# Patient Record
Sex: FEMALE | Race: WHITE | NOT HISPANIC OR LATINO | Employment: FULL TIME | URBAN - METROPOLITAN AREA
[De-identification: names, ages, dates, MRNs, and addresses within clinical notes are randomized per-mention and may not be internally consistent; named-entity substitution may affect disease eponyms.]

---

## 2018-01-19 ENCOUNTER — COMMUNICATION - RIVER FALLS (OUTPATIENT)
Dept: FAMILY MEDICINE | Facility: CLINIC | Age: 23
End: 2018-01-19

## 2018-01-19 ENCOUNTER — OFFICE VISIT - RIVER FALLS (OUTPATIENT)
Dept: FAMILY MEDICINE | Facility: CLINIC | Age: 23
End: 2018-01-19

## 2018-01-19 ASSESSMENT — MIFFLIN-ST. JEOR: SCORE: 1324.21

## 2019-01-03 ENCOUNTER — OFFICE VISIT - RIVER FALLS (OUTPATIENT)
Dept: FAMILY MEDICINE | Facility: CLINIC | Age: 24
End: 2019-01-03

## 2019-01-25 ENCOUNTER — OFFICE VISIT - RIVER FALLS (OUTPATIENT)
Dept: FAMILY MEDICINE | Facility: CLINIC | Age: 24
End: 2019-01-25

## 2019-01-25 ASSESSMENT — MIFFLIN-ST. JEOR: SCORE: 1296.99

## 2020-01-27 ENCOUNTER — OFFICE VISIT - RIVER FALLS (OUTPATIENT)
Dept: FAMILY MEDICINE | Facility: CLINIC | Age: 25
End: 2020-01-27

## 2020-01-27 ASSESSMENT — MIFFLIN-ST. JEOR: SCORE: 1281.57

## 2021-01-28 ENCOUNTER — OFFICE VISIT - RIVER FALLS (OUTPATIENT)
Dept: FAMILY MEDICINE | Facility: CLINIC | Age: 26
End: 2021-01-28

## 2021-01-28 ASSESSMENT — MIFFLIN-ST. JEOR: SCORE: 1259.91

## 2021-02-02 ENCOUNTER — COMMUNICATION - RIVER FALLS (OUTPATIENT)
Dept: FAMILY MEDICINE | Facility: CLINIC | Age: 26
End: 2021-02-02

## 2022-01-20 ENCOUNTER — AMBULATORY - RIVER FALLS (OUTPATIENT)
Dept: FAMILY MEDICINE | Facility: CLINIC | Age: 27
End: 2022-01-20

## 2022-01-31 ENCOUNTER — OFFICE VISIT - RIVER FALLS (OUTPATIENT)
Dept: FAMILY MEDICINE | Facility: CLINIC | Age: 27
End: 2022-01-31

## 2022-02-12 VITALS
DIASTOLIC BLOOD PRESSURE: 76 MMHG | SYSTOLIC BLOOD PRESSURE: 118 MMHG | HEART RATE: 72 BPM | TEMPERATURE: 97.4 F | BODY MASS INDEX: 19.93 KG/M2 | WEIGHT: 124 LBS | HEIGHT: 66 IN

## 2022-02-12 VITALS
HEART RATE: 68 BPM | TEMPERATURE: 97.6 F | HEIGHT: 66 IN | DIASTOLIC BLOOD PRESSURE: 70 MMHG | WEIGHT: 114.6 LBS | SYSTOLIC BLOOD PRESSURE: 122 MMHG | BODY MASS INDEX: 18.42 KG/M2

## 2022-02-12 VITALS
HEART RATE: 79 BPM | DIASTOLIC BLOOD PRESSURE: 60 MMHG | WEIGHT: 110.7 LBS | OXYGEN SATURATION: 96 % | SYSTOLIC BLOOD PRESSURE: 96 MMHG | BODY MASS INDEX: 17.79 KG/M2 | HEIGHT: 66 IN | TEMPERATURE: 97.3 F

## 2022-02-12 VITALS
SYSTOLIC BLOOD PRESSURE: 118 MMHG | HEIGHT: 66 IN | HEART RATE: 68 BPM | BODY MASS INDEX: 18.96 KG/M2 | WEIGHT: 118 LBS | DIASTOLIC BLOOD PRESSURE: 74 MMHG | TEMPERATURE: 97.9 F

## 2022-02-16 NOTE — NURSING NOTE
Comprehensive Intake Entered On:  1/27/2020 5:12 PM CST    Performed On:  1/27/2020 5:11 PM CST by Abdiaziz King               Summary   Chief Complaint :   Pt here today for annual px.    Last Menstrual Period :   1/26/2020 CST   Menstrual Status :   Menarcheal   Weight Measured :   114.6 lb(Converted to: 114 lb 10 oz, 51.98 kg)    Height Measured :   66 in(Converted to: 5 ft 6 in, 167.64 cm)    Body Mass Index :   18.49 kg/m2 (LOW)    Body Surface Area :   1.55 m2   Systolic Blood Pressure :   122 mmHg   Diastolic Blood Pressure :   70 mmHg   Mean Arterial Pressure :   87 mmHg   Peripheral Pulse Rate :   68 bpm   BP Site :   Right arm   BP Method :   Manual   HR Method :   Manual   Temperature Tympanic :   97.6 DegF(Converted to: 36.4 DegC)  (LOW)    Race :      Languages :   English   Ethnicity :   Not  or    Abdiaziz King - 1/27/2020 5:11 PM CST   Health Status   Allergies Verified? :   Yes   Medication History Verified? :   Yes   Medical History Verified? :   Yes   Pre-Visit Planning Status :   Completed   Abdiaziz King - 1/27/2020 5:11 PM CST   Consents   Consent for Immunization Exchange :   Consent Granted   Consent for Immunizations to Providers :   Consent Granted   Abdiaziz King - 1/27/2020 5:11 PM CST   Meds / Allergies   (As Of: 1/27/2020 5:12:15 PM CST)   Allergies (Active)   No known allergies  Estimated Onset Date:   Unspecified ; Created By:   Leslie Griffin; Reaction Status:   Active ; Category:   Drug ; Substance:   No known allergies ; Type:   Allergy ; Updated By:   Leslie Griffin; Reviewed Date:   1/27/2020 5:12 PM CST        Medication List   (As Of: 1/27/2020 5:12:15 PM CST)   Prescription/Discharge Order    ethinyl estradiol-norgestimate  :   ethinyl estradiol-norgestimate ; Status:   Prescribed ; Ordered As Mnemonic:   Tri-Linyah 35 mcg oral tablet ; Simple Display Line:   1 tab(s), po, daily, 84 tab(s), 3 Refill(s) ;  Ordering Provider:   Samara Wilcox; Catalog Code:   ethinyl estradiol-norgestimate ; Order Dt/Tm:   1/25/2019 1:23:28 PM CST          tretinoin topical  :   tretinoin topical ; Status:   Prescribed ; Ordered As Mnemonic:   tretinoin 0.1% topical cream ; Simple Display Line:   1 rylie, top, hs, 45 gm, 5 Refill(s) ; Ordering Provider:   Oscar Aburto MD; Catalog Code:   tretinoin topical ; Order Dt/Tm:   1/7/2016 4:59:09 PM CST          erythromycin topical  :   erythromycin topical ; Status:   Prescribed ; Ordered As Mnemonic:   erythromycin 2% topical solution ; Simple Display Line:   1 rylie, TOP, BID, 60 mL, 3 Refill(s) ; Ordering Provider:   Oscar Aburto MD; Catalog Code:   erythromycin topical ; Order Dt/Tm:   1/7/2016 11:02:13 AM CST            Home Meds    multivitamin with minerals  :   multivitamin with minerals ; Status:   Documented ; Ordered As Mnemonic:   Vitamin D with Minerals oral tablet ; Simple Display Line:   1 tab(s), po, daily, 0 Refill(s) ; Catalog Code:   multivitamin with minerals ; Order Dt/Tm:   1/7/2016 10:34:34 AM CST

## 2022-02-16 NOTE — NURSING NOTE
Comprehensive Intake Entered On:  1/28/2021 4:04 PM CST    Performed On:  1/28/2021 4:00 PM CST by Sagrario Avila LPN               Summary   Chief Complaint :   here for annual exam   Last Menstrual Period :   1/24/2021 CST   Menstrual Status :   Menarcheal   Weight Measured :   110.7 lb(Converted to: 110 lb 11 oz, 50.213 kg)    Height Measured :   65.75 in(Converted to: 5 ft 6 in, 167.00 cm)    Body Mass Index :   18 kg/m2 (LOW)    Body Surface Area :   1.52 m2   Systolic Blood Pressure :   96 mmHg   Diastolic Blood Pressure :   60 mmHg   Mean Arterial Pressure :   72 mmHg   Peripheral Pulse Rate :   79 bpm   BP Site :   Right arm   BP Method :   Manual   Temperature Tympanic :   97.3 DegF(Converted to: 36.3 DegC)  (LOW)    Oxygen Saturation :   96 %   Race :      Languages :   English   Ethnicity :   Not  or    Sagrario Avila LPN - 1/28/2021 4:00 PM CST   Health Status   Allergies Verified? :   Yes   Medication History Verified? :   Yes   Medical History Verified? :   No   Pre-Visit Planning Status :   Completed   Tobacco Use? :   Never smoker   Sagrario Avila LPN - 1/28/2021 4:00 PM CST   Meds / Allergies   (As Of: 1/28/2021 4:04:54 PM CST)   Allergies (Active)   No Known Medication Allergies  Estimated Onset Date:   Unspecified ; Created By:   Sagrario Avila LPN; Reaction Status:   Active ; Category:   Drug ; Substance:   No Known Medication Allergies ; Type:   Allergy ; Updated By:   Sagrario Avila LPN; Reviewed Date:   1/28/2021 4:01 PM CST        Medication List   (As Of: 1/28/2021 4:04:54 PM CST)   Prescription/Discharge Order    ethinyl estradiol-norgestimate  :   ethinyl estradiol-norgestimate ; Status:   Prescribed ; Ordered As Mnemonic:   Tri-Linyah 35 mcg oral tablet ; Simple Display Line:   1 tab(s), po, daily, 84 tab(s), 4 Refill(s) ; Ordering Provider:   Jessica Reina; Catalog Code:   ethinyl estradiol-norgestimate ; Order Dt/Tm:   1/27/2020 5:22:08 PM  CST          tretinoin topical  :   tretinoin topical ; Status:   Prescribed ; Ordered As Mnemonic:   tretinoin 0.1% topical cream ; Simple Display Line:   See Instructions, APPLY TOPICALLY AT BEDTIME, 45 gm, 2 Refill(s) ; Ordering Provider:   Oscar Aburto MD; Catalog Code:   tretinoin topical ; Order Dt/Tm:   12/22/2020 1:24:26 PM CST            Home Meds    multivitamin  :   multivitamin ; Status:   Documented ; Ordered As Mnemonic:   Daily Multiple Vitamins ; Simple Display Line:   daily, 0 Refill(s) ; Catalog Code:   multivitamin ; Order Dt/Tm:   1/28/2021 4:02:11 PM CST          cholecalciferol  :   cholecalciferol ; Status:   Documented ; Ordered As Mnemonic:   Vitamin D3 ; Simple Display Line:   daily, 0 Refill(s) ; Catalog Code:   cholecalciferol ; Order Dt/Tm:   1/28/2021 4:01:44 PM CST            ID Risk Screen   Recent Travel History :   No recent travel   Family Member Travel History :   No recent travel   Other Exposure to Infectious Disease :   Unknown   COVID-19 Testing Status :   Positive COVID-19 test greater than 30 days ago   Sagrario Avila LPN - 1/28/2021 4:00 PM CST   Social History   Social History   (As Of: 1/28/2021 4:04:54 PM CST)   Alcohol:        Current, Beer (12 oz), 1-2 times per month, 1 drinks/episode average.   (Last Updated: 1/29/2020 1:15:40 PM CST by Kadi Zafar CMA)          Tobacco:        Never (less than 100 in lifetime)   (Last Updated: 1/28/2021 4:02:30 PM CST by Sagrario Avila LPN)          Electronic Cigarette/Vaping:        Electronic Cigarette Use: Never.   (Last Updated: 1/28/2021 4:02:33 PM CST by Sagrario Avila LPN)          Substance Abuse:        Never   (Last Updated: 1/29/2020 1:15:40 PM CST by Kadi Zafar CMA)

## 2022-02-16 NOTE — PROGRESS NOTES
Patient:   JAVAN CRAFT            MRN: [id number]            FIN: [id number]               Age:   23 years     Sex:  Female     :  1995   Associated Diagnoses:   Contraceptive management; Well woman exam   Author:   Samara Wilcox      Visit Information      Date of Service: 2019 01:11 pm  Performing Location: [facility]  Encounter#: [id number]      Primary Care Provider (PCP):  Oscar Aburto MD    NPI# [id number]      Chief Complaint   2019 1:16 PM CST    Annual exam.        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    Eye:  Negative.    Ear/Nose/Mouth/Throat:  Negative.    Respiratory:  Negative.    Cardiovascular:  Negative.    Breast:  Negative.    Gastrointestinal:  Negative.    Genitourinary:  Negative.    Gynecologic:  Negative.    Hematology/Lymphatics:  Negative.    Endocrine:  Negative.    Immunologic:  Negative.    Musculoskeletal:  Negative.    Integumentary:  Negative.    Neurologic:  Negative.    Psychiatric:  Negative.       Health Status   Allergies:    Allergic Reactions (Selected)  No known allergies   Medications:  (Selected)   Prescriptions  Prescribed  Tri-Linyah 35 mcg oral tablet: 1 tab(s), po, daily, # 84 tab(s), 1 Refill(s), Type: Maintenance, Pharmacy: [facility], Pt due for annual check up for further refills., 1 tab(s) Oral daily  erythromycin 2% topical solution: 1 rylie, TOP, BID, # 60 mL, 3 Refill(s), Type: Maintenance, Pharmacy: [facility], 1 rylie top bid  tretinoin 0.1% topical cream: 1 rylie, top, hs, # 45 gm, 5 Refill(s), Type: Maintenance, Pharmacy: [facility], to replace tretinoin 0.075%, 1 rylie top   Documented Medications  Documented  Vitamin D with Minerals oral tablet: 1 tab(s), po, daily, 0 Refill(s), Type: Maintenance      Histories   Family History:    No family history items have been selected or recorded.   Procedure history:    No active procedure history items have been selected or recorded.   Social History:        Alcohol Assessment            Never      Tobacco Assessment            Never      Substance Abuse Assessment            Never      Employment and Education Assessment            Student      Home and Environment Assessment            Marital status: Single.      Sexual Assessment            Sexual orientation: Heterosexual.      Physical Examination   Last Menstrual Period: 12/31/2018     Vital Signs   1/25/2019 1:16 PM CST Temperature Tympanic 97.9 DegF    Peripheral Pulse Rate 68 bpm    Pulse Site Radial artery    HR Method Manual    Systolic Blood Pressure 118 mmHg    Diastolic Blood Pressure 74 mmHg    Mean Arterial Pressure 89 mmHg    BP Site Right arm    BP Method Manual      Measurements from flowsheet : Measurements   1/25/2019 1:16 PM CST Height Measured - Standard 66 in    Weight Measured - Standard 118 lb    BSA 1.58 m2    Body Mass Index 19.04 kg/m2      General:  Alert and oriented, No acute distress.    Eye:  Pupils are equal, round and reactive to light, Intact accommodation, Normal conjunctiva, Vision unchanged.         Periorbital area: Within normal limits.    HENT:  Normocephalic, Tympanic membranes are clear, Normal hearing, Oral mucosa is moist, No pharyngeal erythema, No sinus tenderness.    Neck:  Supple, Non-tender, No lymphadenopathy, No thyromegaly.    Respiratory:  Lungs are clear to auscultation, Respirations are non-labored, No chest wall tenderness.    Cardiovascular:  Normal rate, Regular rhythm, No murmur, No edema.    Breast:  No mass, No tenderness.    Gastrointestinal:  Soft, Non-tender, Non-distended,  Normal bowel sounds, No organomegaly.    Genitourinary:  No costovertebral angle tenderness.    Lymphatics:  No lymphadenopathy neck, axilla, groin.    Musculoskeletal:  Normal range of motion, Normal strength, No swelling.    Integumentary:  Warm, Dry, Pink.    Neurologic:  Alert, Oriented, Normal sensory, Normal motor function.    Psychiatric:  Cooperative, Appropriate mood & affect, Normal judgment.       Impression and Plan   Diagnosis     Contraceptive management (EFA95-YO Z30.9).     Well woman exam (XDA36-FM Z01.419).     Patient Instructions:       Counseled: Patient, Regarding diagnosis, Regarding medications, Verbalized understanding.    Summary:  pap due 2021.    Orders     Orders (Selected)   Prescriptions  Prescribed  Tri-Linyah 35 mcg oral tablet: 1 tab(s), po, daily, # 84 tab(s), 3 Refill(s), Type: Maintenance, Pharmacy: [facility], Pt due for annual check up for further refills., 1 tab(s) Oral daily.

## 2022-02-16 NOTE — PROGRESS NOTES
Patient:   JAVAN CRAFT            MRN: [id number]            FIN: [id number]               Age:   22 years     Sex:  Female     :  1995   Associated Diagnoses:   Contraceptive management; Immunization due; Screen for STD (sexually transmitted disease); Well woman exam   Author:   Samara Wilcox      Visit Information      Date of Service: 2018 09:56 am  Performing Location: [facility]  Encounter#: [id number]      Primary Care Provider (PCP):  Oscar Aburto MD, NPI# [id number]      Chief Complaint   2018 9:58 AM CST    Annual exam.      Well Adult History   PPC for her annual wellness visit, has never had a pap smear, did urine based STD testing last year, normal  has been in monogamous relationship for 2 years  would like COCs refilled  in school studying business, goes to [employer]      Review of Systems   Constitutional:  Negative.    Eye:  Negative.    Ear/Nose/Mouth/Throat:  Negative.    Respiratory:  Negative.    Cardiovascular:  Negative.    Breast:  Negative.    Gastrointestinal:  Negative.    Genitourinary:  Negative.    Gynecologic:  Negative, Negative except as documented in history of present illness.    Hematology/Lymphatics:  Negative.    Endocrine:  Negative.    Immunologic:  Negative.    Musculoskeletal:  Negative.    Integumentary:  Negative.    Neurologic:  Negative.    Psychiatric:  Negative.             Health Status   Allergies:    Allergic Reactions (Selected)  No known allergies   Medications:  (Selected)   Prescriptions  Prescribed  Tri-Linyah 35 mcg oral tablet: 1 tab(s), po, daily, # 84 tab(s), 3 Refill(s), Type: Maintenance, Pharmacy: [facility], Pt due for annual check up for further refills., 1 tab(s) po daily  erythromycin 2% topical solution: 1 rylie, TOP, BID, # 60 mL, 3 Refill(s), Type: Maintenance, Pharmacy: [facility], 1 rylie top bid  tretinoin 0.1% topical cream: 1 rylie, top, hs, # 45 gm, 5 Refill(s), Type: Maintenance, Pharmacy:  [facility], to replace tretinoin 0.075%, 1 rylie top   Documented Medications  Documented  Vitamin D with Minerals oral tablet: 1 tab(s), po, daily, 0 Refill(s), Type: Maintenance      Histories   Family History:    No family history items have been selected or recorded.   Procedure history:    No active procedure history items have been selected or recorded.   Social History:        Alcohol Assessment            Never      Tobacco Assessment            Never      Substance Abuse Assessment            Never      Employment and Education Assessment            Student      Home and Environment Assessment            Marital status: Single.      Sexual Assessment            Sexual orientation: Heterosexual.        Physical Examination   Last Menstrual Period: 1/1/2018   Vital Signs   1/19/2018 9:58 AM CST Temperature Tympanic 97.4 DegF  LOW    Peripheral Pulse Rate 72 bpm    Pulse Site Radial artery    HR Method Manual    Systolic Blood Pressure 118 mmHg    Diastolic Blood Pressure 76 mmHg    Mean Arterial Pressure 90 mmHg    BP Site Right arm    BP Method Manual      Measurements from flowsheet : Measurements   1/19/2018 9:58 AM CST Height Measured - Standard 66 in    Weight Measured - Standard 124 lb    BSA 1.62 m2    Body Mass Index 20.01 kg/m2      General:  Alert and oriented, No acute distress.    Eye:  Pupils are equal, round and reactive to light, Intact accommodation, Normal conjunctiva, Vision unchanged.         Periorbital area: Within normal limits.    HENT:  Normocephalic, Tympanic membranes are clear, Normal hearing, Oral mucosa is moist, No pharyngeal erythema, No sinus tenderness.    Neck:  Supple, Non-tender, No lymphadenopathy, No thyromegaly.    Respiratory:  Lungs are clear to auscultation, Respirations are non-labored, No chest wall tenderness.    Cardiovascular:  Normal rate, Regular rhythm, No murmur, No edema.    Breast:  No mass, No tenderness.    Gastrointestinal:  Soft, Non-tender,  Non-distended, Normal bowel sounds, No organomegaly.    Genitourinary:  No costovertebral angle tenderness.         Labia: Bilateral, Within normal limits.         Mons pubis: WNL.         Perineum: Within normal limits.         Vulva: Within normal limits.         Urethra: Within normal limits.         Cervix: Within normal limits.         Uterus: Within normal limits.         Adnexa: Bilateral, Within normal limits.    Lymphatics:  No lymphadenopathy neck, axilla, groin.    Musculoskeletal:  Normal range of motion, Normal strength, No swelling.    Integumentary:  Warm, Dry, Pink.    Neurologic:  Alert, Oriented.    Psychiatric:  Cooperative, Appropriate mood & affect.       Review / Management   Results review:  Lab results   1/19/2018 10:52 AM CST Wet Prep Yeast None Seen    Wet Prep Trichomonas None Seen    Wet Prep Clue Cells None Seen   .       Impression and Plan   Diagnosis     Contraceptive management (CMV50-OG Z30.9).     Immunization due (XJG32-ZI Z23).     Screen for STD (sexually transmitted disease) (DYZ54-PK Z11.3).     Well woman exam (VRO26-ZU Z01.419).     Patient Instructions:       Counseled: Patient, Regarding diagnosis, Regarding medications, Verbalized understanding.    Summary:  pap obtained, G+C and wet prep done  COCs refilled  discussed safety on campus, regular exercise and nutrition  .    Orders     Orders (Selected)   Prescriptions  Prescribed  Tri-Linyah 35 mcg oral tablet: 1 tab(s), po, daily, # 84 tab(s), 3 Refill(s), Type: Maintenance, Pharmacy: [facility], Pt due for annual check up for further refills., 1 tab(s) po daily.

## 2022-02-16 NOTE — PROGRESS NOTES
Patient:   JAVAN CRAFT            MRN: [id number]            FIN: [id number]               Age:   24 years     Sex:  Female     :  1995   Associated Diagnoses:   Well adult   Author:   Jessica Reina      Visit Information      Date of Service: 2020 04:55 pm  Performing Location: [facility]  Encounter#: [id number]      Chief Complaint   2020 5:11 PM CST    Pt here today for annual px.        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  The general health status is good.  The patient's diet is described as balanced.  Exercise: none.  Associated symptoms consist of none.  Last menstrual period: regular.  Medical encounters:.  Additional pertinent history: tobacco use none.        Review of Systems   Constitutional:  Negative except as documented in history of present illness.    Eye:  Negative except as documented in history of present illness.    Respiratory:  Negative except as documented in history of present illness.    Cardiovascular:  Negative except as documented in history of present illness.    Breast:  Negative.    Gastrointestinal:  Negative except as documented in history of present illness.    Genitourinary:  Negative except as documented in history of present illness.    Gynecologic:  Negative except as documented in history of present illness.    Hematology/Lymphatics:  Negative except as documented in history of present illness.    Endocrine:  Negative except as documented in history of present illness.    Immunologic:  Negative except as documented in history of present illness.    Musculoskeletal:  Negative except as documented in history of present illness.    Integumentary:  Negative except as documented in history of present  illness.    Neurologic:  Negative except as documented in history of present illness.    Psychiatric:  Negative except as documented in history of present illness.              Health Status   Allergies:    Allergic Reactions (Selected)  No known allergies   Medications:  (Selected)   Prescriptions  Prescribed  Tri-Linyah 35 mcg oral tablet: 1 tab(s), po, daily, # 84 tab(s), 4 Refill(s), Type: Maintenance, Pharmacy: [facility], 1 tab(s) Oral daily  erythromycin 2% topical solution: 1 rylie, TOP, BID, # 60 mL, 3 Refill(s), Type: Maintenance, Pharmacy: [facility], 1 rylie top bid  tretinoin 0.1% topical cream: 1 rylie, top, hs, # 45 gm, 5 Refill(s), Type: Maintenance, Pharmacy: [facility], to replace tretinoin 0.075%, 1 rylie top hs  Documented Medications  Documented  Vitamin D with Minerals oral tablet: 1 tab(s), po, daily, 0 Refill(s), Type: Maintenance   Problem list:    All Problems  Inactive: Stage 1 Lyme disease / SNOMED CT 07422448  Resolved: Closed fracture of left distal radius / SNOMED CT 84348342  Resolved: Closed fracture of left distal radius / SNOMED CT 52484382  Resolved: Closed fracture of left tibia / SNOMED CT 0860322305  Resolved: Closed fracture of right tibia / SNOMED CT 1972202155  Canceled: Fracture of radius, distal, left, closed / ICD-9-.42      Histories   Past Medical History:    Resolved  Closed fracture of left distal radius (09402160): Onset in the month of 2/2009 at 13 years  Resolved.  Closed fracture of left distal radius (67401193): Onset on 2/6/2004 at 8 years.  Resolved.  Closed fracture of left tibia (7700102237): Onset on 7/7/2001 at 5 years.  Resolved.  Closed fracture of right tibia (3204248831): Onset on 3/11/1999 at 3 years.  Resolved.   Family History:    No family history items have been selected or recorded.   Procedure history:    No active procedure history items have been selected or recorded.   Social History:        Alcohol Assessment            Never      Tobacco  Assessment            Never      Substance Abuse Assessment            Never      Employment and Education Assessment            Student      Home and Environment Assessment            Marital status: Single.      Sexual Assessment            Sexual orientation: Heterosexual.        Physical Examination   Vital Signs   1/27/2020 5:11 PM CST Temperature Tympanic 97.6 DegF  LOW    Peripheral Pulse Rate 68 bpm    HR Method Manual    Systolic Blood Pressure 122 mmHg    Diastolic Blood Pressure 70 mmHg    Mean Arterial Pressure 87 mmHg    BP Site Right arm    BP Method Manual      Measurements from flowsheet : Measurements   1/27/2020 5:11 PM CST Height Measured - Standard 66 in    Weight Measured - Standard 114.6 lb    BSA 1.55 m2    Body Mass Index 18.49 kg/m2  LOW      General:  Alert and oriented, No acute distress, vital signs stable, as noted above.    Eye:  Pupils are equal, round and reactive to light, Extraocular movements are intact, Normal conjunctiva.    HENT:  Normocephalic, Tympanic membranes are clear, Normal hearing, Oral mucosa is moist, No pharyngeal erythema.    Neck:  Supple, Non-tender, No lymphadenopathy, No thyromegaly.    Respiratory:  Lungs are clear to auscultation, Respirations are non-labored, Breath sounds are equal, Symmetrical chest wall expansion.    Cardiovascular:  Normal rate, Regular rhythm, No murmur, No edema.    Gastrointestinal:  Soft, Non-tender, Non-distended, No organomegaly.    Musculoskeletal:  Normal range of motion, Normal strength, No deformity, Normal gait.    Integumentary:  Warm, Dry, Pink, Intact, No rash.    Neurologic:  Alert, Oriented, Normal sensory, Normal motor function, Cranial Nerves II-XII are grossly intact.    Psychiatric:  Cooperative, Appropriate mood & affect, Normal judgment, PHQ 9/CAGE questionaire reviewed and discussed with patient,  see score.       Impression and Plan   Diagnosis     Well adult (SBD83-DN Z00.00).     Patient Instructions:        Counseled: Patient, Regarding diagnosis, Regarding medications, Verbalized understanding, counseled on health benefits of healthy weight, regular exercise, healthy diet.    Orders     Orders (Selected)   Prescriptions  Prescribed  Tri-Linyah 35 mcg oral tablet: 1 tab(s), po, daily, # 84 tab(s), 4 Refill(s), Type: Maintenance, Pharmacy: [facility], 1 tab(s) Oral daily.

## 2022-02-16 NOTE — NURSING NOTE
Comprehensive Intake Entered On:  1/25/2019 1:21 PM CST    Performed On:  1/25/2019 1:16 PM CST by Naomi Silav CMA               Summary   Chief Complaint :   Annual exam.    Last Menstrual Period :   12/31/2018 CST   Menstrual Status :   Menarcheal   Weight Measured :   118 lb(Converted to: 118 lb 0 oz, 53.52 kg)    Height Measured :   66 in(Converted to: 5 ft 6 in, 167.64 cm)    Body Mass Index :   19.04 kg/m2   Body Surface Area :   1.58 m2   Systolic Blood Pressure :   118 mmHg   Diastolic Blood Pressure :   74 mmHg   Mean Arterial Pressure :   89 mmHg   Peripheral Pulse Rate :   68 bpm   BP Site :   Right arm   Pulse Site :   Radial artery   BP Method :   Manual   HR Method :   Manual   Temperature Tympanic :   97.9 DegF(Converted to: 36.6 DegC)    Race :      Languages :   English   Ethnicity :   Not  or    Naomi Silva CMA - 1/25/2019 1:16 PM CST   Health Status   Allergies Verified? :   Yes   Medication History Verified? :   Yes   Pre-Visit Planning Status :   Completed   Tobacco Use? :   Never smoker   Naomi Silva CMA - 1/25/2019 1:16 PM CST   Consents   Consent for Immunization Exchange :   Consent Granted   Consent for Immunizations to Providers :   Consent Granted   Naomi Silva CMA - 1/25/2019 1:16 PM CST   Meds / Allergies   (As Of: 1/25/2019 1:21:24 PM CST)   Allergies (Active)   No known allergies  Estimated Onset Date:   Unspecified ; Created By:   Leslie Griffin; Reaction Status:   Active ; Category:   Drug ; Substance:   No known allergies ; Type:   Allergy ; Updated By:   Leslie Griffin; Reviewed Date:   1/19/2018 11:02 AM CST        Medication List   (As Of: 1/25/2019 1:21:24 PM CST)   Prescription/Discharge Order    erythromycin topical  :   erythromycin topical ; Status:   Prescribed ; Ordered As Mnemonic:   erythromycin 2% topical solution ; Simple Display Line:   1 rylie, TOP, BID, 60 mL, 3 Refill(s) ; Ordering Provider:   Oscar Aburto MD; Catalog Code:    erythromycin topical ; Order Dt/Tm:   1/7/2016 11:02:13 AM          ethinyl estradiol-norgestimate  :   ethinyl estradiol-norgestimate ; Status:   Prescribed ; Ordered As Mnemonic:   Tri-Linyah 35 mcg oral tablet ; Simple Display Line:   1 tab(s), po, daily, 84 tab(s), 1 Refill(s) ; Ordering Provider:   Samara Wilcox; Catalog Code:   ethinyl estradiol-norgestimate ; Order Dt/Tm:   1/3/2019 11:05:24 AM          tretinoin topical  :   tretinoin topical ; Status:   Prescribed ; Ordered As Mnemonic:   tretinoin 0.1% topical cream ; Simple Display Line:   1 rylie, top, hs, 45 gm, 5 Refill(s) ; Ordering Provider:   Oscar Aburto MD; Catalog Code:   tretinoin topical ; Order Dt/Tm:   1/7/2016 4:59:09 PM            Home Meds    multivitamin with minerals  :   multivitamin with minerals ; Status:   Documented ; Ordered As Mnemonic:   Vitamin D with Minerals oral tablet ; Simple Display Line:   1 tab(s), po, daily, 0 Refill(s) ; Catalog Code:   multivitamin with minerals ; Order Dt/Tm:   1/7/2016 10:34:34 AM

## 2022-02-16 NOTE — TELEPHONE ENCOUNTER
Entered by Vernell Pal LPN on November 09, 2021 10:04:57 AM CST  ---------------------  From: Vernell Pal LPN   To: [facility]    Sent: 11/9/2021 10:04:57 AM CST  Subject: Medication Management     ** Not Approved: Patient has requested refill too soon, Rx sent 1/28/21 #84 with 3 refills **  ethinyl estradiol-norgestimate (TRI-LINYAH  TABLET)  TAKE 1 TABLET BY MOUTH EVERY  Qty:  84 tab(s)        Days Supply:  84        Refills:  3          Substitutions Allowed     Route To Pharmacy - [facility]   Signed by Vernell Pal LPN            ------------------------------------------  From: Vennli  To: Jessica Reina  Sent: November 3, 2021 9:05:21 AM CDT  Subject: Medication Management  Due: October 21, 2021 8:18:07 PM CDT     ** On Hold Pending Signature **     Drug: ethinyl estradiol-norgestimate (Tri-Linyah 35 mcg oral tablet), TAKE 1 TABLET BY MOUTH EVERY  Quantity: 84 tab(s)  Days Supply: 84  Refills: 3  Substitutions Allowed  Notes from Pharmacy:     Dispensed Drug: ethinyl estradiol-norgestimate (Tri-Linyah 35 mcg oral tablet), TAKE 1 TABLET BY MOUTH EVERY  Quantity: 84 tab(s)  Days Supply: 84  Refills: 3  Substitutions Allowed  Notes from Pharmacy:  ------------------------------------------

## 2022-02-16 NOTE — TELEPHONE ENCOUNTER
---------------------  From: Jessica Reina   To: JAVAN HANLEY    Sent: 2/2/2021 10:39:18 AM CST  Subject: General Message       The result of your recent pap smear is normal.  A pap smear is a test for cervical cancer.  National recommendations regarding frequency of pap smear testing have changed.   The frequency with which you need this test is dependent upon your personal history of pap results and specific risk factors. Your next pap smear should done in 3 years.  You should return in a year for your annual physical, but a pap smear will not need to be completed at that time.  Please call me if you have any questions, thank-you.      SUE Mayers

## 2022-02-16 NOTE — TELEPHONE ENCOUNTER
---------------------  From: Marylin Rose CMA (eRx Pool ([id number]_[facility]))   To: [facility] Message Pool ([id number]_[place] [facility] [place]);     Sent: 12/22/2020 12:00:18 PM CST  Subject: FW: Medication Management   Due Date/Time: 12/22/2020 8:35:00 AM CST     Med Refill      Date of last Med Check / Px:   1/21/20  Date of last labs pertaining to med:  _    RTC order in chart:  yes, next month    pt had this filled in 2016 by [facility], please advise          ** Patient matched by Marylin Rose CMA on 12/22/2020 11:57:54 AM CST **      ------------------------------------------  From: [employer]  To: Oscar Aburto MD  Sent: December 21, 2020 8:35:22 AM CST  Subject: Medication Management  Due: December 16, 2020 8:25:29 PM CST     ** On Hold Pending Signature **     Drug: tretinoin topical (tretinoin 0.1% topical cream), APPLY TOPICALLY AT BEDTIME  Quantity: 45 gm  Days Supply: 30  Refills: 5  Substitutions Allowed  Notes from Pharmacy:     Dispensed Drug: tretinoin topical (tretinoin 0.1% topical cream), APPLY TOPICALLY AT BEDTIME  Quantity: 45 gm  Days Supply: 30  Refills: 5  Substitutions Allowed  Notes from Pharmacy:  ---------------------------------------------------------------  From: Alicia Goetz LPN ([facility] Message Pool ([id number]_[facility]))   To: Oscar Aburto MD;     Sent: 12/22/2020 12:01:41 PM CST  Subject: FW: Medication Management   Due Date/Time: 12/22/2020 8:35:00 AM CST---------------------  From: Oscar Aburto MD   To: [facility]    Sent: 12/22/2020 1:24:34 PM CST  Subject: FW: Medication Management     ** Submitted: **  Complete:tretinoin topical (tretinoin 0.1% topical cream)   Signed by Oscar Aburto MD  12/22/2020 7:24:00 PM [facility]    ** Approved with modifications: **  tretinoin topical (TRETINOIN 0.1% CREAM)  APPLY TOPICALLY AT BEDTIME  Qty:  45 gm        Days Supply:  30        Refills:  2          Substitutions Allowed     Route To Pharmacy - [facility]

## 2022-02-16 NOTE — PROCEDURES
Accession Number:       [id number]  CLINICAL INFORMATION::     None given  LMP::     NONE GIVEN  PREV. PAP::     N/A  PREV. BX::     NONE GIVEN  SOURCE::     Cervix  STATEMENT OF ADEQUACY::     Satisfactory for evaluation. Endocervical/transformation zone component present. Age and/or menstrual status not provided  INTERPRETATION/RESULT::     Negative for intraepithelial lesion or malignancy.  COMMENT::     This Pap test has been evaluated with computer assisted technology.  CYTOTECHNOLOGIST::     ROSINA SANTIAGO(ASCP) CT Screening location: [place] [id number]

## 2022-02-16 NOTE — PROCEDURES
Accession Number:       [id number]  CLINICAL INFORMATION::     None given  LMP::     01/24/2021  PREV. PAP::     01/19/2018 WNL  PREV. BX::     NONE GIVEN  SOURCE::     Cervix  STATEMENT OF ADEQUACY::     Satisfactory for evaluation. Endocervical/transformation zone component present.  INTERPRETATION/RESULT::     Negative for intraepithelial lesion or malignancy.  COMMENT::     This Pap test has been evaluated with computer assisted technology.  CYTOTECHNOLOGIST::     ROSINA AVALOS(ASCP) CT Screening location: [place] [id number]  COMMENT:     See comment       EXPLANATORY NOTE:         The Pap is a screening test for cervical cancer. It is       not a diagnostic test and is subject to false negative       and false positive results. It is most reliable when a       satisfactory sample, regularly obtained, is submitted       with relevant clinical findings and history, and when       the Pap result is evaluated along with historic and       current clinical information.

## 2022-02-16 NOTE — NURSING NOTE
Depression Screening Entered On:  1/27/2020 5:57 PM CST    Performed On:  1/27/2020 5:57 PM CST by Sagrario Avila LPN               Depression Screening   Little Interest - Pleasure in Activities :   Not at all   Feeling Down, Depressed, Hopeless :   Not at all   Initial Depression Screen Score :   0    Trouble Falling or Staying Asleep :   Not at all   Feeling Tired or Little Energy :   Not at all   Poor Appetite or Overeating :   Not at all   Feeling Bad About Yourself :   Not at all   Trouble Concentrating :   Not at all   Moving or Speaking Slowly :   Not at all   Thoughts Better Off Dead or Hurting Self :   Not at all   Detailed Depression Screen Score :   0    Total Depression Screen Score :   0    Sagrario Avila LPN - 1/27/2020 5:57 PM CST

## 2022-02-16 NOTE — TELEPHONE ENCOUNTER
A summary of the submitted changes has been added to the patient's chart. You may review the document by clicking on the message attachment located above.Submission Details -Originating Source: ProxyOriginating Author: LALI TEJADAubmission Date: 01/22/2019 at 07:43 PMAppointment Details - Appointment Date: 01/25/2019 at 01:20 PM Appointment Type: PHYSICAL - VHAppointment Resource: Steph Wilcox Location: [facility]

## 2022-02-16 NOTE — PROGRESS NOTES
Patient:   JAVAN CRFAT            MRN: [id number]            FIN: [id number]               Age:   25 years     Sex:  Female     :  1995   Associated Diagnoses:   Well adult   Author:   Jessica Reina      Visit Information      Date of Service: 2021 03:54 pm  Performing Location: [facility]  Encounter#: [id number]      Primary Care Provider (PCP):  Oscar Aburto MD    NPI# [id number]      Referring Provider:  Jessica Reina    NPI# [id number]      Chief Complaint   2021 4:00 PM CST    here for annual exam        Well Adult History   Well Adult History             The patient presents for well adult exam, The patient presents for well adult exam, doing great, 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  The general health status is good.  The patient's diet is described as balanced.  Exercise: routine.  Associated symptoms consist of none.  Last menstrual period: regular.  Medical encounters:.  Additional pertinent history: tobacco use none.        Review of Systems   Constitutional:  Negative except as documented in history of present illness.    Eye:  Negative except as documented in history of present illness.    Respiratory:  Negative except as documented in history of present illness.    Cardiovascular:  Negative except as documented in history of present illness.    Breast:  Negative.    Gastrointestinal:  Negative except as documented in history of present illness.    Genitourinary:  Negative except as documented in history of present illness.    Gynecologic:  Negative except as documented in history of present illness.    Hematology/Lymphatics:  Negative except as documented in history of present illness.    Endocrine:  Negative except as documented in history of present  illness.    Immunologic:  Negative except as documented in history of present illness.    Musculoskeletal:  Negative except as documented in history of present illness.    Integumentary:  Negative except as documented in history of present illness.    Neurologic:  Negative except as documented in history of present illness.    Psychiatric:  Negative except as documented in history of present illness.              Health Status   Allergies:    Allergic Reactions (Selected)  No Known Medication Allergies   Medications:  (Selected)   Prescriptions  Prescribed  Tri-Linyah 35 mcg oral tablet: 1 tab(s), po, daily, # 84 tab(s), 3 Refill(s), Type: Maintenance, Pharmacy: [facility], 1 tab(s) Oral daily, 65.75, in, 01/28/21 16:00:00 CST, Height Measured, 110.7, lb, 01/28/21 16:00:00 CST, Weight Measured  tretinoin 0.1% topical cream: See Instructions, Instructions: APPLY TOPICALLY AT BEDTIME , # 45 gm, 2 Refill(s), Type: Maintenance, Pharmacy: [facility], APPLY TOPICALLY AT BEDTIME, 66, in, 01/27/20 17:11:00 CST, Height Measured, 114.6, lb, 01/27/20 17:11:00 CST, Weight Jonathan...  Documented Medications  Documented  Daily Multiple Vitamins: daily, 0 Refill(s), Type: Maintenance  Vitamin D3: daily, 0 Refill(s), Type: Maintenance   Problem list:    All Problems  Inactive: Stage 1 Lyme disease / SNOMED CT 61219887  Resolved: Closed fracture of left distal radius / SNOMED CT 76985502  Resolved: Closed fracture of left distal radius / SNOMED CT 67869320  Resolved: Closed fracture of right tibia / SNOMED CT 1663345263  Resolved: Closed fracture of left tibia / SNOMED CT 3214717156  Canceled: Fracture of radius, distal, left, closed / ICD-9-.42      Histories   Past Medical History:    Resolved  Closed fracture of left distal radius (33478728): Onset in the month of 2/2009 at 13 years  Resolved.  Closed fracture of left distal radius (12330489): Onset on 2/6/2004 at 8 years.  Resolved.  Closed fracture of left tibia  (7896832172): Onset on 7/7/2001 at 5 years.  Resolved.  Closed fracture of right tibia (6661557324): Onset on 3/11/1999 at 3 years.  Resolved.   Family History:    Grandmother (M)  Pancreatic cancer..  Grandmother (P)  CA - Breast cancer  Aunt (P)  CA - Breast cancer  Aunt (P)  Pancreatic cancer..     Procedure history:    No active procedure history items have been selected or recorded.   Social History:        Electronic Cigarette/Vaping Assessment            Electronic Cigarette Use: Never.      Alcohol Assessment            Current, Beer (12 oz), 1-2 times per month, 1 drinks/episode average.      Tobacco Assessment            Never (less than 100 in lifetime)      Substance Abuse Assessment            Never        Physical Examination   Vital Signs   1/28/2021 4:00 PM CST Temperature Tympanic 97.3 DegF  LOW    Peripheral Pulse Rate 79 bpm    Systolic Blood Pressure 96 mmHg    Diastolic Blood Pressure 60 mmHg    Mean Arterial Pressure 72 mmHg    BP Site Right arm    BP Method Manual    Oxygen Saturation 96 %      Measurements from flowsheet : Measurements   1/28/2021 4:00 PM CST Height Measured - Standard 65.75 in    Weight Measured - Standard 110.7 lb    BSA 1.52 m2    Body Mass Index 18 kg/m2  LOW      General:  Alert and oriented, No acute distress, vital signs stable, as noted above.    Eye:  Pupils are equal, round and reactive to light, Extraocular movements are intact, Normal conjunctiva.    HENT:  Normocephalic, Tympanic membranes are clear, Normal hearing.    Neck:  Supple, Non-tender, No lymphadenopathy, No thyromegaly.    Respiratory:  Lungs are clear to auscultation, Respirations are non-labored, Breath sounds are equal, Symmetrical chest wall expansion.    Cardiovascular:  Normal rate, Regular rhythm, No murmur, No edema.    Breast:  No mass, No tenderness, No discharge, Breasts examined .  No infra nor supraclavicular nodes palpable.  No axillary nodes or masses palpable.  No nipple discharge.  Breasts normal throughout.    Gastrointestinal:  Soft, Non-tender, Non-distended, No organomegaly.    Genitourinary:  Normal genitalia for age and sex, No inguinal tenderness, No urethral discharge, No lesions.         Perineum: Within normal limits.         Groin/ inguinal region: Within normal limits.         Urethra: Within normal limits.         Vagina: Within normal limits.         Labia: Within normal limits.         Cervix: Within normal limits.         Uterus: Within normal limits.         Adnexa: Within normal limits.    Lymphatics:  no axillary, no supra or infraclavicular nor inguinal lymphadenopathy palpable.    Musculoskeletal:  Normal range of motion, Normal strength, No deformity, Normal gait.    Integumentary:  Warm, Dry, Pink, Intact, No rash.    Neurologic:  Alert, Oriented, Normal sensory, Normal motor function, Cranial Nerves II-XII are grossly intact.    Psychiatric:  Cooperative, Appropriate mood & affect, Normal judgment, PHQ 9/CAGE questionaire reviewed and discussed with patient,  see score.       Impression and Plan   Diagnosis     Well adult (KIM26-AA Z00.00).     Patient Instructions:       Counseled: Patient, Regarding diagnosis, Regarding medications, Verbalized understanding, counseled on health benefits of healthy weight, regular exercise, healthy diet.    Orders     Orders (Selected)   Prescriptions  Prescribed  Tri-Linyah 35 mcg oral tablet: 1 tab(s), po, daily, # 84 tab(s), 3 Refill(s), Type: Maintenance, Pharmacy: [facility], 1 tab(s) Oral daily, 65.75, in, 01/28/21 16:00:00 CST, Height Measured, 110.7, lb, 01/28/21 16:00:00 CST, Weight Measured.     pap today.

## 2022-03-02 VITALS
HEIGHT: 67 IN | OXYGEN SATURATION: 99 % | DIASTOLIC BLOOD PRESSURE: 64 MMHG | WEIGHT: 114.7 LBS | HEART RATE: 72 BPM | TEMPERATURE: 97.9 F | SYSTOLIC BLOOD PRESSURE: 112 MMHG | BODY MASS INDEX: 18 KG/M2

## 2022-03-02 NOTE — PROGRESS NOTES
Patient:   JAVAN HANLEY            MRN: [id number]            FIN: [id number]               Age:   26 years     Sex:  Female     :  1995   Associated Diagnoses:   Well adult; Family planning, BCP maintenance   Author:   Jessica Reina      Visit Information      Date of Service: 2022 07:54 am  Performing Location: [facility]  Encounter#: [id number]      Primary Care Provider (PCP):  Oscar Aburto MD    NPI# [id number]      Referring Provider:  Jessica Reina    NPI# [id number]      Chief Complaint   2022 7:59 AM CST    here for annual exam, stopped taking BCP about one month ago      Well Adult History   Well Adult History             The patient presents for well adult exam, the patient presents for well adult exam, doing great, sells fertilizer   , 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  will start multi vit for folic acid. Can call for oc's if changes her mind.  The general health status is good.  The patient's diet is described as balanced.  Exercise: routine.  Associated symptoms consist of weight gain and counseled regarding low wt.  Last menstrual period: regular.  Medical encounters:.  Additional pertinent history: tobacco use none.        Review of Systems   Constitutional:  Negative except as documented in history of present illness.    Eye:  Negative except as documented in history of present illness.    Respiratory:  Negative except as documented in history of present illness.    Cardiovascular:  Negative except as documented in history of present illness.    Breast:  Negative.    Gastrointestinal:  Negative except as documented in history of present illness.    Genitourinary:  Negative except as documented in history of present illness.    Gynecologic:  Negative except as  documented in history of present illness.    Hematology/Lymphatics:  Negative except as documented in history of present illness.    Endocrine:  Negative except as documented in history of present illness.    Immunologic:  Negative except as documented in history of present illness.    Musculoskeletal:  Negative except as documented in history of present illness.    Integumentary:  Negative except as documented in history of present illness.    Neurologic:  Negative except as documented in history of present illness.    Psychiatric:  Negative except as documented in history of present illness.              Health Status   Allergies:    Allergic Reactions (Selected)  No Known Medication Allergies   Medications:  (Selected)   Prescriptions  Prescribed  Tri-Linyah 35 mcg oral tablet: 1 tab(s), po, daily, # 84 tab(s), 3 Refill(s), Type: Maintenance, Pharmacy: [facility], 1 tab(s) Oral daily, 65.75, in, 01/28/21 16:00:00 CST, Height Measured, 110.7, lb, 01/28/21 16:00:00 CST, Weight Measured  tretinoin 0.1% topical cream: See Instructions, Instructions: APPLY TOPICALLY AT BEDTIME , # 45 gm, 2 Refill(s), Type: Maintenance, Pharmacy: [facility], APPLY TOPICALLY AT BEDTIME, 66, in, 01/27/20 17:11:00 CST, Height Measured, 114.6, lb, 01/27/20 17:11:00 CST, Weight Jonathan...  Documented Medications  Documented  Daily Multiple Vitamins: daily, 0 Refill(s), Type: Maintenance  Vitamin D3: daily, 0 Refill(s), Type: Maintenance,    Medications          *denotes recorded medication          *Vitamin D3: daily, 0 Refill(s).          Tri-Linyah 35 mcg oral tablet: 1 tab(s), po, daily, 84 tab(s), 3 Refill(s).          *Daily Multiple Vitamins: daily, 0 Refill(s).          tretinoin 0.1% topical cream: See Instructions, APPLY TOPICALLY AT BEDTIME, 45 gm, 2 Refill(s).       Problem list:    All Problems  Inactive: Stage 1 Lyme disease / SNOMED CT 96630454  Resolved: Closed fracture of right tibia / SNOMED CT 4377841812  Resolved:  Closed fracture of left tibia / SNOMED CT 3220064761  Resolved: Closed fracture of left distal radius / SNOMED CT 72002162  Resolved: Closed fracture of left distal radius / SNOMED CT 34397792  Canceled: Fracture of radius, distal, left, closed / ICD-9-.42      Histories   Past Medical History:    Resolved  Closed fracture of left distal radius (79347303): Onset in the month of 2/2009 at 13 years  Resolved.  Closed fracture of left distal radius (67998691): Onset on 2/6/2004 at 8 years.  Resolved.  Closed fracture of left tibia (1681588571): Onset on 7/7/2001 at 5 years.  Resolved.  Closed fracture of right tibia (4916774791): Onset on 3/11/1999 at 3 years.  Resolved.   Family History:    Grandmother (M)  Pancreatic cancer..  Grandmother (P)  CA - Breast cancer  Aunt (P)  CA - Breast cancer  Aunt (P)  Pancreatic cancer..     Procedure history:    No active procedure history items have been selected or recorded.   Social History:        Electronic Cigarette/Vaping Assessment            Electronic Cigarette Use: Never.      Alcohol Assessment            Current, Beer (12 oz), 1-2 times per month, 1 drinks/episode average.      Tobacco Assessment            Never (less than 100 in lifetime)      Substance Abuse Assessment            Never        Physical Examination   Vital Signs   1/31/2022 7:59 AM CST Temperature Tympanic 97.9 DegF    Peripheral Pulse Rate 72 bpm    Systolic Blood Pressure 112 mmHg    Diastolic Blood Pressure 64 mmHg    Mean Arterial Pressure 80 mmHg    BP Site Right arm    BP Method Manual    Oxygen Saturation 99 %      Measurements from flowsheet : Measurements   1/31/2022 7:59 AM CST Height Measured - Standard 66.5 in    Weight Measured - Standard 114.7 lb    BSA 1.56 m2    Body Mass Index 18.23 kg/m2  LOW      General:  Alert and oriented, No acute distress, vital signs stable, as noted above.    Eye:  Pupils are equal, round and reactive to light, Extraocular movements are intact, Normal  conjunctiva.    HENT:  Normocephalic, Tympanic membranes are clear, Normal hearing.    Neck:  Supple, Non-tender, No lymphadenopathy, No thyromegaly.    Respiratory:  Lungs are clear to auscultation, Respirations are non-labored, Breath sounds are equal, Symmetrical chest wall expansion.    Cardiovascular:  Normal rate, Regular rhythm, No murmur, No edema.    Breast:  No mass, No tenderness, No discharge, Breasts examined .  No infra nor supraclavicular nodes palpable.  No axillary nodes or masses palpable.  No nipple discharge. Breasts normal throughout.    Gastrointestinal:  Soft, Non-tender, Non-distended, No organomegaly.    Musculoskeletal:  Normal range of motion, Normal strength, No deformity, Normal gait.    Integumentary:  Warm, Dry, Pink, Intact, No rash.    Neurologic:  Alert, Oriented, Normal sensory, Normal motor function, Cranial Nerves II-XII are grossly intact.    Psychiatric:  Cooperative, Appropriate mood & affect, Normal judgment, PHQ 9/CAGE questionaire reviewed and discussed with patient,  see score.       Impression and Plan   Diagnosis     Well adult ([id number]-ZI Z00.00).     Patient Instructions:       Counseled: Patient, Regarding diagnosis, Regarding medications, Verbalized understanding, counseled on health benefits of healthy weight, regular exercise, healthy diet.    Orders

## 2022-03-02 NOTE — NURSING NOTE
Comprehensive Intake Entered On:  1/31/2022 8:04 AM CST    Performed On:  1/31/2022 7:59 AM CST by Sagrario Avila LPN               Summary   Chief Complaint :   here for annual exam, stopped taking BCP about one month ago   Last Menstrual Period :   1/27/2022 CST   Menstrual Status :   Menarcheal   Weight Measured :   114.7 lb(Converted to: 114 lb 11 oz, 52.027 kg)    Height Measured :   66.5 in(Converted to: 5 ft 6 in, 168.91 cm)    Body Mass Index :   18.23 kg/m2 (LOW)    Body Surface Area :   1.56 m2   Systolic Blood Pressure :   112 mmHg   Diastolic Blood Pressure :   64 mmHg   Mean Arterial Pressure :   80 mmHg   Peripheral Pulse Rate :   72 bpm   BP Site :   Right arm   BP Method :   Manual   Temperature Tympanic :   97.9 DegF(Converted to: 36.6 DegC)    Oxygen Saturation :   99 %   Race :      Languages :   English   Ethnicity :   Not  or    Sagrario Avila LPN - 1/31/2022 7:59 AM CST   Health Status   Allergies Verified? :   Yes   Medication History Verified? :   Yes   Medical History Verified? :   No   Pre-Visit Planning Status :   Completed   Tobacco Use? :   Never smoker   Sagrario Avila LPN - 1/31/2022 7:59 AM CST   Meds / Allergies   (As Of: 1/31/2022 8:04:57 AM CST)   Allergies (Active)   No Known Medication Allergies  Estimated Onset Date:   Unspecified ; Created By:   Sagrario Avila LPN; Reaction Status:   Active ; Category:   Drug ; Substance:   No Known Medication Allergies ; Type:   Allergy ; Updated By:   Sagrario Avila LPN; Reviewed Date:   1/28/2021 4:01 PM CST        Medication List   (As Of: 1/31/2022 8:04:57 AM CST)   Prescription/Discharge Order    tretinoin topical  :   tretinoin topical ; Status:   Prescribed ; Ordered As Mnemonic:   tretinoin 0.1% topical cream ; Simple Display Line:   See Instructions, APPLY TOPICALLY AT BEDTIME, 45 gm, 2 Refill(s) ; Ordering Provider:   Oscar Aburto MD; Catalog Code:   tretinoin topical ; Order Dt/Tm:    12/22/2020 1:24:26 PM CST          ethinyl estradiol-norgestimate  :   ethinyl estradiol-norgestimate ; Status:   Prescribed ; Ordered As Mnemonic:   Tri-Linyah 35 mcg oral tablet ; Simple Display Line:   1 tab(s), po, daily, 84 tab(s), 3 Refill(s) ; Ordering Provider:   Jessica Reina; Catalog Code:   ethinyl estradiol-norgestimate ; Order Dt/Tm:   1/28/2021 4:08:24 PM CST            Home Meds    multivitamin  :   multivitamin ; Status:   Documented ; Ordered As Mnemonic:   Daily Multiple Vitamins ; Simple Display Line:   daily, 0 Refill(s) ; Catalog Code:   multivitamin ; Order Dt/Tm:   1/28/2021 4:02:11 PM CST          cholecalciferol  :   cholecalciferol ; Status:   Documented ; Ordered As Mnemonic:   Vitamin D3 ; Simple Display Line:   daily, 0 Refill(s) ; Catalog Code:   cholecalciferol ; Order Dt/Tm:   1/28/2021 4:01:44 PM CST            Social History   Social History   (As Of: 1/31/2022 8:04:57 AM CST)   Alcohol:        Current, Beer (12 oz), 1-2 times per month, 1 drinks/episode average.   (Last Updated: 1/29/2020 1:15:40 PM CST by Kadi Zafar CMA)          Tobacco:        Never (less than 100 in lifetime)   (Last Updated: 1/31/2022 8:02:59 AM CST by Sagrario Avila LPN)          Electronic Cigarette/Vaping:        Electronic Cigarette Use: Never.   (Last Updated: 1/31/2022 8:03:01 AM CST by Sagrario Avila LPN)          Substance Abuse:        Never   (Last Updated: 1/29/2020 1:15:40 PM CST by Kadi Zafar CMA)

## 2022-05-04 ENCOUNTER — TRANSFERRED RECORDS (OUTPATIENT)
Dept: HEALTH INFORMATION MANAGEMENT | Facility: CLINIC | Age: 27
End: 2022-05-04

## 2023-02-03 ENCOUNTER — OFFICE VISIT (OUTPATIENT)
Dept: FAMILY MEDICINE | Facility: CLINIC | Age: 28
End: 2023-02-03
Payer: COMMERCIAL

## 2023-02-03 VITALS
WEIGHT: 117.4 LBS | SYSTOLIC BLOOD PRESSURE: 110 MMHG | HEART RATE: 55 BPM | RESPIRATION RATE: 14 BRPM | TEMPERATURE: 97.3 F | OXYGEN SATURATION: 98 % | DIASTOLIC BLOOD PRESSURE: 72 MMHG | BODY MASS INDEX: 18.43 KG/M2 | HEIGHT: 67 IN

## 2023-02-03 DIAGNOSIS — Z00.00 ANNUAL PHYSICAL EXAM: Primary | ICD-10-CM

## 2023-02-03 PROCEDURE — 99395 PREV VISIT EST AGE 18-39: CPT | Performed by: NURSE PRACTITIONER

## 2023-02-03 ASSESSMENT — ENCOUNTER SYMPTOMS
SORE THROAT: 0
NERVOUS/ANXIOUS: 0
DIZZINESS: 0
HEMATOCHEZIA: 0
ENDOCRINE NEGATIVE: 1
JOINT SWELLING: 0
ABDOMINAL PAIN: 0
PALPITATIONS: 0
ALLERGIC/IMMUNOLOGIC NEGATIVE: 1
HEMATOLOGIC/LYMPHATIC NEGATIVE: 1
HEARTBURN: 0
EYE PAIN: 0
BREAST MASS: 0
MYALGIAS: 0
ARTHRALGIAS: 0
PARESTHESIAS: 0
DIARRHEA: 0
SHORTNESS OF BREATH: 0
HEADACHES: 0
HEMATURIA: 0
FREQUENCY: 0
DYSURIA: 0
NAUSEA: 0
CHILLS: 0
WEAKNESS: 0
COUGH: 0
CONSTIPATION: 0
FEVER: 0

## 2023-02-03 NOTE — PROGRESS NOTES
SUBJECTIVE:   CC: Lena is an 27 year old who presents for preventive health visit.     Patient has been advised of split billing requirements and indicates understanding: Yes     Doing well  , monthly periods, using condoms, considering pregnancy  Works selling fertilizer  FH of breast and pancreatic cancers, offered genetic counseling, she will consider  Counseled on importance of COVID booster, she will consider  Enjoys her horses  Healthy Habits:     Getting at least 3 servings of Calcium per day:  Yes    Bi-annual eye exam:  Yes    Dental care twice a year:  Yes    Sleep apnea or symptoms of sleep apnea:  None    Diet:  Regular (no restrictions)    Frequency of exercise:  4-5 days/week    Duration of exercise:  45-60 minutes    Taking medications regularly:  Yes    Barriers to taking medications:  None    Medication side effects:  None    PHQ-2 Total Score: 0    Additional concerns today:  No    Today's PHQ-2 Score:   PHQ-2 ( 1999 Pfizer) 2/3/2023   Q1: Little interest or pleasure in doing things 0   Q2: Feeling down, depressed or hopeless 0   PHQ-2 Score 0   Q1: Little interest or pleasure in doing things Not at all   Q2: Feeling down, depressed or hopeless Not at all   PHQ-2 Score 0       Have you ever done Advance Care Planning? (For example, a Health Directive, POLST, or a discussion with a medical provider or your loved ones about your wishes): No, advance care planning information given to patient to review.  Patient declined advance care planning discussion at this time.    Social History     Tobacco Use     Smoking status: Never     Smokeless tobacco: Never   Substance Use Topics     Alcohol use: Not on file     If you drink alcohol do you typically have >3 drinks per day or >7 drinks per week? No    Alcohol Use 2/3/2023   Prescreen: >3 drinks/day or >7 drinks/week? No       Reviewed orders with patient.  Reviewed health maintenance and updated orders accordingly - Yes      Breast Cancer  Screening:    Breast cancer--paternal gma and aunt and maternal aunt  Pancreatic cancer x2  Other's   FHS-7:   Breast CA Risk Assessment (FHS-7) 2/3/2023   Did any of your first-degree relatives have breast or ovarian cancer? No   Did any of your relatives have bilateral breast cancer? Yes   Did any man in your family have breast cancer? No   Did any woman in your family have breast and ovarian cancer? No   Did any woman in your family have breast cancer before age 50 y? Unknown   Do you have 2 or more relatives with breast and/or ovarian cancer? No   Do you have 2 or more relatives with breast and/or bowel cancer? No       Patient under 40 years of age: Routine Mammogram Screening not recommended.   Pertinent mammograms are reviewed under the imaging tab.    History of abnormal Pap smear: NO - age 21-29 PAP every 3 years recommended     Reviewed and updated as needed this visit by clinical staff   Tobacco  Allergies  Meds              Reviewed and updated as needed this visit by Provider                     Review of Systems   Constitutional: Negative for chills and fever.   HENT: Negative for congestion, ear pain, hearing loss and sore throat.    Eyes: Negative for pain and visual disturbance.   Respiratory: Negative for cough and shortness of breath.    Cardiovascular: Negative for chest pain, palpitations and peripheral edema.   Gastrointestinal: Negative for abdominal pain, constipation, diarrhea, heartburn, hematochezia and nausea.   Endocrine: Negative.    Breasts:  Negative for tenderness, breast mass and discharge.   Genitourinary: Negative for dysuria, frequency, genital sores, hematuria, pelvic pain, urgency, vaginal bleeding and vaginal discharge.   Musculoskeletal: Negative for arthralgias, joint swelling and myalgias.   Skin: Negative for rash.   Allergic/Immunologic: Negative.    Neurological: Negative for dizziness, weakness, headaches and paresthesias.   Hematological: Negative.   "  Psychiatric/Behavioral: Negative for mood changes. The patient is not nervous/anxious.           OBJECTIVE:   /72   Pulse 55   Temp 97.3  F (36.3  C)   Resp 14   Ht 1.689 m (5' 6.5\")   Wt 53.3 kg (117 lb 6.4 oz)   LMP 2023 (Exact Date)   SpO2 98%   BMI 18.66 kg/m    Physical Exam  GENERAL: healthy, alert and no distress  EYES: Eyes grossly normal to inspection, PERRL and conjunctivae and sclerae normal  HENT: ear canals and TM's normal, nose and mouth without ulcers or lesions  NECK: no adenopathy, no asymmetry, masses, or scars and thyroid normal to palpation  RESP: lungs clear to auscultation - no rales, rhonchi or wheezes  BREAST: normal without masses, tenderness or nipple discharge and no palpable axillary masses or adenopathy  CV: regular rate and rhythm, normal S1 S2, no S3 or S4, no murmur, click or rub, no peripheral edema and peripheral pulses strong  ABDOMEN: soft, nontender, no hepatosplenomegaly, no masses and bowel sounds normal  MS: no gross musculoskeletal defects noted, no edema  NEURO: Normal strength and tone, mentation intact and speech normal  BACK: no CVA tenderness, no paralumbar tenderness  PSYCH: mentation appears normal, affect normal/bright        ASSESSMENT/PLAN:       ICD-10-CM    1. Annual physical exam  Z00.00                 COUNSELING:  Reviewed preventive health counseling, as reflected in patient instructions       Regular exercise       Healthy diet/nutrition       Contraception       Folic Acid       Consider Hep C screening for all patients one time for ages 18-79 years       HIV screeninx in teen years, 1x in adult years, and at intervals if high risk        She reports that she has never smoked. She has never used smokeless tobacco.          Jessica Carlos NP  [facility]  "

## 2024-01-04 ENCOUNTER — PATIENT OUTREACH (OUTPATIENT)
Dept: CARE COORDINATION | Facility: CLINIC | Age: 29
End: 2024-01-04
Payer: COMMERCIAL

## 2024-01-18 ENCOUNTER — PATIENT OUTREACH (OUTPATIENT)
Dept: CARE COORDINATION | Facility: CLINIC | Age: 29
End: 2024-01-18
Payer: COMMERCIAL

## 2024-03-10 ENCOUNTER — HEALTH MAINTENANCE LETTER (OUTPATIENT)
Age: 29
End: 2024-03-10

## 2025-03-16 ENCOUNTER — HEALTH MAINTENANCE LETTER (OUTPATIENT)
Age: 30
End: 2025-03-16